# Patient Record
Sex: FEMALE | Race: WHITE | NOT HISPANIC OR LATINO | Employment: STUDENT | URBAN - METROPOLITAN AREA
[De-identification: names, ages, dates, MRNs, and addresses within clinical notes are randomized per-mention and may not be internally consistent; named-entity substitution may affect disease eponyms.]

---
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
## 2024-01-05 ENCOUNTER — OFFICE VISIT (OUTPATIENT)
Dept: PEDIATRICS | Facility: CLINIC | Age: 19
End: 2024-01-05
Payer: COMMERCIAL

## 2024-01-05 ENCOUNTER — LAB (OUTPATIENT)
Dept: LAB | Facility: LAB | Age: 19
End: 2024-01-05
Payer: COMMERCIAL

## 2024-01-05 VITALS — TEMPERATURE: 98.2 F | BODY MASS INDEX: 21.4 KG/M2 | WEIGHT: 124.7 LBS

## 2024-01-05 DIAGNOSIS — J45.21 MILD INTERMITTENT ASTHMA WITH ACUTE EXACERBATION (HHS-HCC): ICD-10-CM

## 2024-01-05 DIAGNOSIS — R89.9 ABNORMAL LABORATORY TEST RESULT: ICD-10-CM

## 2024-01-05 DIAGNOSIS — R89.9 ABNORMAL LABORATORY TEST RESULT: Primary | ICD-10-CM

## 2024-01-05 PROCEDURE — 1036F TOBACCO NON-USER: CPT | Performed by: PEDIATRICS

## 2024-01-05 PROCEDURE — 99213 OFFICE O/P EST LOW 20 MIN: CPT | Performed by: PEDIATRICS

## 2024-01-05 PROCEDURE — 80053 COMPREHEN METABOLIC PANEL: CPT

## 2024-01-05 PROCEDURE — 36415 COLL VENOUS BLD VENIPUNCTURE: CPT

## 2024-01-05 NOTE — PROGRESS NOTES
Subjective   Patient ID: Carito Rodriguez is a 18 y.o. female, otherwise healthy, who presents for Follow-up (Follow up from ER visit on 12/3/23 for an allergic reaction. Labs were abnormal, potassium was 2.9.).  She is alone today.    HPI  Carito Rodriguez is a 18 y.o. female presenting for a sick visit, unaccompanied.  She was at a party and was given an Amaretto drink to have.  She did not believe it had Amaretto in it.  Afterwards she started to feel like she was having an allergic reaction.  She self administered her EpiPen and went to the [facility] in [facility].  Her lab work at that time showed a low potassium.    Review of Systems  The following history was obtained from the patient.   Constitutional: Otherwise denies fever, chills, or changes in behavior. No difficulties with sleeping, eating, drinking, urine output, or bowel movements.    Eyes, ENT: Denies eye complaints, ear complaints, nasal congestion, runny nose, or sore throat.   Cardio/Resp: Denies chest pain, palpitations, shortness of breath, wheezing, stridor at rest, cough, working hard to breathe, or breathing fast.   GI/Renal: Denies nausea, vomiting, stomachache, diarrhea, or constipation. Denies dysuria or abnormal urine color or smell.   Musculoskeletal/Skin: Denies muscle or joint complaints. Denies skin rash.   Neuro/Psych: Denies headache, dizziness, confusion, irritability, or fussiness.   Endo/heme/lymph: Denies excessive thirst, excessive sweating, bruising, bleeding, or swollen glands.     Objective   Temp 36.8 °C (98.2 °F) (Temporal)   Wt 56.6 kg (124 lb 11.2 oz)   BMI 21.40 kg/m²   BSA: 1.6 meters squared  Growth percentiles: No height on file for this encounter. 45 %ile (Z= -0.12) based on CDC (Girls, 2-20 Years) weight-for-age data using vitals from 1/5/2024.     Physical Exam  Constitutional: Well developed, well nourished, well hydrated and no acute distress.  Head and Face: Normocephalic,  atraumatic.  Inspection and palpation of the face: Normal.  Eyes: Conjunctiva and lids normal.  Ears, Nose, Mouth, and Throat: No nasal discharge. External ears and nose without deformities. TM's normal color, normal landmarks, no fluid, non-retracted. External auditory canals without swelling, redness or tenderness. Pharyngeal mucosa with 2+ injected tonsils and injected tonsillar pillars and uvula.  No exudate, or lesions. Mucous membranes moist. Internal nose without abnormalities.  Neck: Positive bilateral anterior cervical lymph nodes are 3 x 0.5 cm and are nontender and mobile.  Thyroid not enlarged.  Pulmonary: No grunting, flaring or retractions. Clear to auscultation.  Cardiovascular: Regular rate and rhythm. No significant murmur.  Chest: Normal without deformity.  Abdomen: Soft, non-tender, no masses. No hepatomegaly or splenomegaly.   Skin: No significant rash or lesions.    Problem List Items Addressed This Visit             ICD-10-CM    Asthma J45.909    Abnormal laboratory test result - Primary R89.9    Relevant Orders    Comprehensive metabolic panel       Assessment/Plan Thank you for involving me in your care today. Carito is here for follow-up of a low potassium for her emergency room visit she had.  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  We will repeat a comprehensive metabolic panel today and fill out her withdrawal from school insurance paperwork.

## 2024-01-05 NOTE — PATIENT INSTRUCTIONS
Thank you for involving me in your care today. Carito is here for follow-up of a low potassium for her emergency room visit she had.  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  We will repeat a comprehensive metabolic panel today and fill out her withdrawal from school insurance paperwork.

## 2024-01-06 LAB
ALBUMIN SERPL BCP-MCNC: 4.7 G/DL (ref 3.4–5)
ALP SERPL-CCNC: 90 U/L (ref 33–110)
ALT SERPL W P-5'-P-CCNC: 12 U/L (ref 7–45)
ANION GAP SERPL CALC-SCNC: 13 MMOL/L (ref 10–20)
AST SERPL W P-5'-P-CCNC: 16 U/L (ref 9–39)
BILIRUB SERPL-MCNC: 1.2 MG/DL (ref 0–1.2)
BUN SERPL-MCNC: 13 MG/DL (ref 6–23)
CALCIUM SERPL-MCNC: 10.2 MG/DL (ref 8.6–10.6)
CHLORIDE SERPL-SCNC: 104 MMOL/L (ref 98–107)
CO2 SERPL-SCNC: 27 MMOL/L (ref 21–32)
CREAT SERPL-MCNC: 0.73 MG/DL (ref 0.5–1.05)
GFR SERPL CREATININE-BSD FRML MDRD: >90 ML/MIN/1.73M*2
GLUCOSE SERPL-MCNC: 75 MG/DL (ref 74–99)
POTASSIUM SERPL-SCNC: 4.3 MMOL/L (ref 3.5–5.3)
PROT SERPL-MCNC: 7.7 G/DL (ref 6.4–8.2)
SODIUM SERPL-SCNC: 140 MMOL/L (ref 136–145)

## 2024-10-02 ENCOUNTER — OFFICE VISIT (OUTPATIENT)
Dept: PEDIATRICS | Facility: CLINIC | Age: 19
End: 2024-10-02
Payer: COMMERCIAL

## 2024-10-02 VITALS — TEMPERATURE: 98.1 F | WEIGHT: 118 LBS | BODY MASS INDEX: 20.25 KG/M2

## 2024-10-02 DIAGNOSIS — J01.40 ACUTE NON-RECURRENT PANSINUSITIS: Primary | ICD-10-CM

## 2024-10-02 PROCEDURE — 1036F TOBACCO NON-USER: CPT | Performed by: PEDIATRICS

## 2024-10-02 PROCEDURE — 99213 OFFICE O/P EST LOW 20 MIN: CPT | Performed by: PEDIATRICS

## 2024-10-02 RX ORDER — AMOXICILLIN AND CLAVULANATE POTASSIUM 875; 125 MG/1; MG/1
875 TABLET, FILM COATED ORAL 2 TIMES DAILY
Qty: 20 TABLET | Refills: 0 | Status: SHIPPED | OUTPATIENT
Start: 2024-10-02 | End: 2024-10-12

## 2024-10-02 NOTE — PATIENT INSTRUCTIONS
Carito Rodriguez is a 19 y.o. female presenting for a sick visit. Medical, medication and allergy histories were reviewed. The patient has been sick for 3 weeks. She started with runny nose, nasal congestion, cough and fever up to 100 F (for one week). She then had a lingering runny nose and cough last week. Two days ago, she became sleepy, and has had a fever for the last 2 days. She has a mid-face headache.    Your child has a sinus infection, and I have prescribed Augmentin 875 mg, and your child's dose is 1 tablet(s) twice a day for 10 days.      If your child starts to have diarrhea, I would recommend strongly giving your child acidophilus. You can give this to him/her as Culturelle, Florastor, Align, or other types. I would give your child a one-unit dose 2 hours after giving the antibiotic. If you give it at the same time as the antibiotic, there will be decreased effectiveness of the antibiotic. Ask the pharmacist what the one-unit dose for your child would be. Probiotics are not controlled by the FDA, so there is no unified dosing. Call if your child gets worse.      Colds can last up to 2 weeks and do not need antibiotics, as they are caused by a virus. There are things you can do to help a cold get better faster.      #1 Hydrating your child will help a lot. For example, for an older child, 4-6 of the 16-ounce water bottles per day will help flush the virus from the system. Make sure your child is urinating once every 8 hours if they are older than one year old, and once every 6 hours if they are under the age of 1.      #2 Nasal saline washes will also clear the sinuses and not allow the mucous to get infected.      #3 Cool mist humidifier in the bedroom at night will help thin out the mucus as well. Just make sure it is cleaned regularly or you may be putting yeast spores into the environment. Near the humidifier equipment in all drugstores, you can find small balls that you put into the water of a cool  mist humidifier, and it prevents growth of anything or the sliminess for up to a month. One brand is Protect.      #4 Vitamin and zinc supplements may also help to some degree. Please give zinc on a full stomach, as sometimes it can make children nauseous. Children from 1-6 years old may have 25 mg of zinc per day. Older than that may have 50 mg per day.

## 2024-10-02 NOTE — PROGRESS NOTES
Subjective   Patient ID: Carito Rodriguez is a 19 y.o. female, otherwise healthy, who presents for URI (Sinus pressure ).    HPI  Carito Rodriguez is a 19 y.o. female presenting for a sick visit. Medical, medication and allergy histories were reviewed. The patient has been sick for 3 weeks. She started with runny nose, nasal congestion, cough and fever up to 100 F (for one week). She then had a lingering runny nose and cough last week. Two days ago, she became sleepy, and has had a fever for the last 2 days. She has a mid-face headache.    Review of Systems  The following history was obtained from patient.   Constitutional: Positive fever, sleepy. Otherwise denies chills, or changes in behavior. No difficulties with eating, drinking, urine output, or bowel movements.    Eyes, ENT: Positive runny nose, nasal congestion. Denies eye complaints, ear complaints, or sore throat.   Cardio/Resp: Positive cough. Denies chest pain, palpitations, shortness of breath, wheezing, stridor at rest, working hard to breathe, or breathing fast.   GI/Renal: Denies nausea, vomiting, stomachache, diarrhea, or constipation. Denies dysuria or abnormal urine color or smell.   Musculoskeletal/Skin: Denies muscle or joint complaints. Denies skin rash.   Neuro/Psych: Positive mid-face headache. Denies dizziness, confusion, irritability, or fussiness.   Endo/heme/lymph: Denies excessive thirst, excessive sweating, bruising, bleeding, or swollen glands.     Current Outpatient Medications   Medication Sig Dispense Refill    albuterol 90 mcg/actuation inhaler Inhale 2 puffs every 4 hours if needed for wheezing. 18 g 2    amoxicillin-pot clavulanate (Augmentin) 875-125 mg tablet Take 1 tablet (875 mg) by mouth 2 times a day for 10 days. 20 tablet 0    Auvi-Q 0.3 mg/0.3 mL injection syringe Inject 0.3 mL (0.3 mg) as directed if needed for anaphylaxis.      cholecalciferol, vitamin D3, (VITAMIN D3 ORAL) Take by mouth.      inhalational spacing device (POCKET  CHAMBER) inhaler Use as instructed 1 each 0    medroxyPROGESTERone 150 mg/mL injection INJECT 1 (ONE) mL INTRAMUSCULARLY once       No current facility-administered medications for this visit.        Allergies   Allergen Reactions    Peanut Anaphylaxis and Hives    Tree Nut Anaphylaxis    Banana Itching    Banana Extract Itching    Egg Hives        No family history on file.     Objective   Temp 36.7 °C (98.1 °F)   Wt 53.5 kg (118 lb)   BMI 20.25 kg/m²   BSA: 1.55 meters squared  Growth percentiles: Facility age limit for growth %regi is 20 years. Facility age limit for growth %regi is 20 years.     Physical Exam  Constitutional: Well developed, well nourished, well hydrated and no acute distress.  HENT:      Head: Both frontal and maxillary sinuses are painful to palpation.     Ears: External ears normal and without deformities. Normal TMs.       Nose: Nose normal, patent nares and without deformities.      Mouth/Throat: Very injected tonsillar pillars. Injected tonsils. Injected uvula.  Eyes: Conjunctiva and lids normal.  Neck: Bilateral anterior cervical lymph nodes are  3 cm by 0.5 cm , mildly tender but mobile.    Pulmonary: No grunting, flaring or retractions. Clear to auscultation.  Cardiovascular: Regular rate and rhythm. No significant murmur.  Chest: Normal without deformity.  Abdomen: Soft, non-tender, no masses. No hepatomegaly or splenomegaly.   Skin: No significant rash or lesions.    Problem List Items Addressed This Visit             ICD-10-CM    Acute non-recurrent pansinusitis - Primary J01.40    Relevant Medications    amoxicillin-pot clavulanate (Augmentin) 875-125 mg tablet     Time in: 11:30 am  Time done: 11:41 am    Assessment/Plan    Carito Rodriguez is a 19 y.o. female presenting for a sick visit. Medical, medication and allergy histories were reviewed. The patient has been sick for 3 weeks. She started with runny nose, nasal congestion, cough and fever up to 100 F (for one week). She then  had a lingering runny nose and cough last week. Two days ago, she became sleepy, and has had a fever for the last 2 days. She has a mid-face headache.    Your child has a sinus infection, and I have prescribed Augmentin 875 mg, and your child's dose is 1 tablet(s) twice a day for 10 days.      If your child starts to have diarrhea, I would recommend strongly giving your child acidophilus. You can give this to him/her as Culturelle, Florastor, Align, or other types. I would give your child a one-unit dose 2 hours after giving the antibiotic. If you give it at the same time as the antibiotic, there will be decreased effectiveness of the antibiotic. Ask the pharmacist what the one-unit dose for your child would be. Probiotics are not controlled by the FDA, so there is no unified dosing. Call if your child gets worse.      Colds can last up to 2 weeks and do not need antibiotics, as they are caused by a virus. There are things you can do to help a cold get better faster.      #1 Hydrating your child will help a lot. For example, for an older child, 4-6 of the 16-ounce water bottles per day will help flush the virus from the system. Make sure your child is urinating once every 8 hours if they are older than one year old, and once every 6 hours if they are under the age of 1.      #2 Nasal saline washes will also clear the sinuses and not allow the mucous to get infected.      #3 Cool mist humidifier in the bedroom at night will help thin out the mucus as well. Just make sure it is cleaned regularly or you may be putting yeast spores into the environment. Near the humidifier equipment in all drugstores, you can find small balls that you put into the water of a cool mist humidifier, and it prevents growth of anything or the sliminess for up to a month. One brand is Protect.      #4 Vitamin and zinc supplements may also help to some degree. Please give zinc on a full stomach, as sometimes it can make children nauseous.  Children from 1-6 years old may have 25 mg of zinc per day. Older than that may have 50 mg per day.     Scribe Attestation  By signing my name below, I, Beatriz Garcia, attest that this documentation has been prepared under the direction and in the presence of Dr. Mihaela Hankins.    Provider Attestation - Scribe documentation  All medical record entries made by the Scribe were at my direction and personally dictated by me. I have reviewed the chart and agree that the record accurately reflects my personal performance of the history, physical exam, discussion and plan.

## 2024-11-06 ENCOUNTER — OFFICE VISIT (OUTPATIENT)
Dept: PEDIATRICS | Facility: CLINIC | Age: 19
End: 2024-11-06
Payer: COMMERCIAL

## 2024-11-06 VITALS
BODY MASS INDEX: 19.56 KG/M2 | WEIGHT: 117.4 LBS | HEIGHT: 65 IN | DIASTOLIC BLOOD PRESSURE: 70 MMHG | SYSTOLIC BLOOD PRESSURE: 110 MMHG

## 2024-11-06 DIAGNOSIS — F90.2 ATTENTION DEFICIT HYPERACTIVITY DISORDER (ADHD), COMBINED TYPE: Primary | ICD-10-CM

## 2024-11-06 PROCEDURE — 1036F TOBACCO NON-USER: CPT | Performed by: PEDIATRICS

## 2024-11-06 PROCEDURE — 3008F BODY MASS INDEX DOCD: CPT | Performed by: PEDIATRICS

## 2024-11-06 PROCEDURE — 99214 OFFICE O/P EST MOD 30 MIN: CPT | Performed by: PEDIATRICS

## 2024-11-06 RX ORDER — DEXTROAMPHETAMINE SACCHARATE, AMPHETAMINE ASPARTATE, DEXTROAMPHETAMINE SULFATE AND AMPHETAMINE SULFATE 5; 5; 5; 5 MG/1; MG/1; MG/1; MG/1
20 TABLET ORAL 2 TIMES DAILY
Qty: 60 TABLET | Refills: 0 | Status: SHIPPED | OUTPATIENT
Start: 2024-11-06 | End: 2024-12-06

## 2024-11-06 NOTE — PATIENT INSTRUCTIONS
Carito presents for a ADHD follow-up.    I prescribed short acting Adderall 20 mg, twice per day.    PLEASE FOLLOW-UP WITH ME IN 1 MONTH.

## 2024-11-06 NOTE — PROGRESS NOTES
Subjective   Patient ID: Carito Rodriguez is a 19 y.o. female, otherwise healthy, who presents for a follow-up for ADHD.    HPI  Carito Rodriguez is a 19 y.o. female presenting for a follow-up on ADHD. She was last on Adderall IR 20 mg, 1-2 times per day. Adderall XR did not help her. She started going to [employer] in 2022 and left in January 2024. She would like to go back on ADHD medication.  She has tried numerous stimulants and they have not worked for her.  She would like to go back on something that she knows works for her until [employer] school is over.  She finishes next summer.    Review of Systems  The following history was obtained from patient.   Constitutional: Otherwise denies fever, chills, or changes in behavior. No difficulties with sleeping, eating, drinking, urine output, or bowel movements.    Eyes, ENT: Denies eye complaints, ear complaints, nasal congestion, runny nose, or sore throat.   Cardio/Resp: Denies chest pain, palpitations, shortness of breath, wheezing, stridor at rest, cough, working hard to breathe, or breathing fast.   GI/Renal: Denies nausea, vomiting, stomachache, diarrhea, or constipation. Denies dysuria or abnormal urine color or smell.   Musculoskeletal/Skin: Denies muscle or joint complaints. Denies skin rash.   Neuro/Psych: Positive ADHD. Denies headache, dizziness, confusion, irritability, or fussiness.   Endo/heme/lymph: Denies excessive thirst, excessive sweating, bruising, bleeding, or swollen glands.     Current Outpatient Medications   Medication Sig Dispense Refill    albuterol 90 mcg/actuation inhaler Inhale 2 puffs every 4 hours if needed for wheezing. 18 g 2    amphetamine-dextroamphetamine (Adderall) 20 mg tablet Take 1 tablet (20 mg) by mouth 2 times a day. 60 tablet 0    Auvi-Q 0.3 mg/0.3 mL injection syringe Inject 0.3 mL (0.3 mg) as directed if needed for anaphylaxis.      cholecalciferol, vitamin D3, (VITAMIN D3 ORAL) Take by mouth.      inhalational spacing  "device (POCKET CHAMBER) inhaler Use as instructed 1 each 0    medroxyPROGESTERone 150 mg/mL injection INJECT 1 (ONE) mL INTRAMUSCULARLY once       No current facility-administered medications for this visit.        Allergies   Allergen Reactions    Peanut Anaphylaxis and Hives    Tree Nut Anaphylaxis    Banana Itching    Banana Extract Itching    Egg Hives        No family history on file.     Objective   /70   Ht 1.657 m (5' 5.25\")   Wt 53.3 kg (117 lb 6.4 oz)   BMI 19.39 kg/m²   BSA: 1.57 meters squared  Growth percentiles: Facility age limit for growth %regi is 20 years. Facility age limit for growth %regi is 20 years.     Physical Exam  Constitutional: Well developed, well nourished, well hydrated and no acute distress.  HENT:      Head: Normocephalic, atraumatic, normal palpation and inspection of face.      Ears: External ears normal and without deformities. Normal TMs.       Nose: Nose normal, patent nares and without deformities.      Mouth/Throat: Mucous membranes are moist. Normal palate. Oropharynx is clear.  Eyes: Conjunctiva and lids normal.  Neck: No significant cervical adenopathy. Thyroid not enlarged.  Pulmonary: No grunting, flaring or retractions. Clear to auscultation.  Cardiovascular: Regular rate and rhythm. No significant murmur.  Chest: Normal without deformity.  Abdomen: Soft, non-tender, no masses. No hepatomegaly or splenomegaly.   Skin: No significant rash or lesions.    Problem List Items Addressed This Visit             ICD-10-CM       Mental Health    ADHD (attention deficit hyperactivity disorder) - Primary F90.9    Relevant Medications    amphetamine-dextroamphetamine (Adderall) 20 mg tablet     Time in: 10:58 am  Time done: 11:18 am    Assessment/Plan    Carito presents for a ADHD follow-up.    I prescribed short acting Adderall 20 mg, twice per day.    PLEASE FOLLOW-UP WITH ME IN 1 MONTH.     Scribe Attestation  By signing my name below, I, Yomi Harris, Beatriz, attest " that this documentation has been prepared under the direction and in the presence of Dr. Mihaela Hankins.    Provider Attestation - Scribe documentation  All medical record entries made by the Scribe were at my direction and personally dictated by me. I have reviewed the chart and agree that the record accurately reflects my personal performance of the history, physical exam, discussion and plan.

## 2024-11-18 ENCOUNTER — OFFICE VISIT (OUTPATIENT)
Dept: PEDIATRICS | Facility: CLINIC | Age: 19
End: 2024-11-18
Payer: COMMERCIAL

## 2024-11-18 VITALS — WEIGHT: 119.4 LBS | TEMPERATURE: 98.1 F | BODY MASS INDEX: 19.72 KG/M2

## 2024-11-18 DIAGNOSIS — N61.0 NIPPLE INFECTION IN FEMALE: Primary | ICD-10-CM

## 2024-11-18 DIAGNOSIS — B37.31 VAGINAL YEAST INFECTION: ICD-10-CM

## 2024-11-18 PROCEDURE — 99213 OFFICE O/P EST LOW 20 MIN: CPT | Performed by: PEDIATRICS

## 2024-11-18 PROCEDURE — 1036F TOBACCO NON-USER: CPT | Performed by: PEDIATRICS

## 2024-11-18 RX ORDER — FLUCONAZOLE 150 MG/1
150 TABLET ORAL ONCE
Qty: 1 TABLET | Refills: 1 | Status: SHIPPED | OUTPATIENT
Start: 2024-11-18 | End: 2024-11-18

## 2024-11-18 RX ORDER — MUPIROCIN 20 MG/G
OINTMENT TOPICAL 3 TIMES DAILY
Qty: 22 G | Refills: 0 | Status: SHIPPED | OUTPATIENT
Start: 2024-11-18 | End: 2024-11-28

## 2024-11-18 RX ORDER — CEPHALEXIN 500 MG/1
500 CAPSULE ORAL 3 TIMES DAILY
Qty: 30 CAPSULE | Refills: 0 | Status: SHIPPED | OUTPATIENT
Start: 2024-11-18 | End: 2024-11-28

## 2024-11-18 NOTE — PROGRESS NOTES
Subjective   Patient ID: Carito Rodriguez is a 19 y.o. female  who presents for infected nipple piercing.        HPI:  Carito has had her nipples pierced for about 3 years.  She wears the same jewelry, but recently snagged it on a towel.  For the past 3 days, both nipples have been looking infected.  No fever or chills.    She also gets vaginal yeast infections when she is on an antibiotic, so she would like Diflucan if one is prescribed.                  Objective   Temp 36.7 °C (98.1 °F)   Wt 54.2 kg (119 lb 6.4 oz)   BMI 19.72 kg/m²   BSA: 1.58 meters squared  Growth percentiles: Facility age limit for growth %regi is 20 years. Facility age limit for growth %regi is 20 years.     Physical Exam  Constitutional:       Appearance: Normal appearance.   Skin:     Comments: Metal stud through both nipples, with some crusted drainage at the piercing sites bilaterally.  No purulence or visible swelling.  No significant erythema.    Neurological:      Mental Status: She is alert.         Assessment/Plan   Diagnoses and all orders for this visit:  Nipple infection in female  -     mupirocin (Bactroban) 2 % ointment; Apply topically 3 times a day for 10 days.  -     cephalexin (Keflex) 500 mg capsule; Take 1 capsule (500 mg) by mouth 3 times a day for 10 days.  Vaginal yeast infection  -     fluconazole (Diflucan) 150 mg tablet; Take 1 tablet (150 mg) by mouth 1 time for 1 dose.  Discussed removing nipple jewelry if symptoms are not improving over the next several days.

## 2024-12-06 ENCOUNTER — APPOINTMENT (OUTPATIENT)
Dept: PEDIATRICS | Facility: CLINIC | Age: 19
End: 2024-12-06
Payer: COMMERCIAL

## 2024-12-19 ENCOUNTER — APPOINTMENT (OUTPATIENT)
Dept: PEDIATRICS | Facility: CLINIC | Age: 19
End: 2024-12-19
Payer: COMMERCIAL

## 2024-12-19 VITALS
BODY MASS INDEX: 19.99 KG/M2 | SYSTOLIC BLOOD PRESSURE: 108 MMHG | DIASTOLIC BLOOD PRESSURE: 70 MMHG | HEIGHT: 65 IN | WEIGHT: 120 LBS

## 2024-12-19 DIAGNOSIS — Z00.00 WELL ADULT EXAM: Primary | ICD-10-CM

## 2024-12-19 DIAGNOSIS — F90.2 ATTENTION DEFICIT HYPERACTIVITY DISORDER (ADHD), COMBINED TYPE: ICD-10-CM

## 2024-12-19 PROCEDURE — 99213 OFFICE O/P EST LOW 20 MIN: CPT | Performed by: PEDIATRICS

## 2024-12-19 PROCEDURE — 3008F BODY MASS INDEX DOCD: CPT | Performed by: PEDIATRICS

## 2024-12-19 PROCEDURE — 1036F TOBACCO NON-USER: CPT | Performed by: PEDIATRICS

## 2024-12-19 PROCEDURE — 99395 PREV VISIT EST AGE 18-39: CPT | Performed by: PEDIATRICS

## 2024-12-19 PROCEDURE — 96127 BRIEF EMOTIONAL/BEHAV ASSMT: CPT | Performed by: PEDIATRICS

## 2024-12-19 RX ORDER — DEXTROAMPHETAMINE SACCHARATE, AMPHETAMINE ASPARTATE, DEXTROAMPHETAMINE SULFATE AND AMPHETAMINE SULFATE 5; 5; 5; 5 MG/1; MG/1; MG/1; MG/1
20 TABLET ORAL DAILY
Qty: 30 TABLET | Refills: 0 | Status: SHIPPED | OUTPATIENT
Start: 2024-12-19 | End: 2025-01-18

## 2024-12-19 RX ORDER — DEXTROAMPHETAMINE SACCHARATE, AMPHETAMINE ASPARTATE, DEXTROAMPHETAMINE SULFATE AND AMPHETAMINE SULFATE 5; 5; 5; 5 MG/1; MG/1; MG/1; MG/1
20 TABLET ORAL DAILY
Qty: 30 TABLET | Refills: 0 | Status: SHIPPED | OUTPATIENT
Start: 2025-01-18 | End: 2025-02-17

## 2024-12-19 RX ORDER — DEXTROAMPHETAMINE SACCHARATE, AMPHETAMINE ASPARTATE, DEXTROAMPHETAMINE SULFATE AND AMPHETAMINE SULFATE 5; 5; 5; 5 MG/1; MG/1; MG/1; MG/1
20 TABLET ORAL DAILY
Qty: 30 TABLET | Refills: 0 | Status: SHIPPED | OUTPATIENT
Start: 2025-02-17 | End: 2025-03-19

## 2024-12-19 ASSESSMENT — PATIENT HEALTH QUESTIONNAIRE - GENERAL
DIZZINESS: NOT BOTHERED
CONSTIPATION, LOOSE BOWELS, OR DIARRHEA: NOT BOTHERED
DO YOU OFTEN EAT, WITHIN ANY 2-HOUR PERIOD, WHAT MOST PEOPLE WOULD REGARD AS AN UNUSUALLY LARGE AMOUNT OF FOOD: NO
MENSTRUAL CRAMPS OR OTHER PROBLEMS WITH YOUR PERIODS: NOT BOTHERED
YOU HAD A PROBLEM GETTING ALONG WITH OTHER PEOPLE WHILE YOU WERE DRINKING: NO
YOU MISSED OR WERE LATE FOR WORK, SCHOOL, OR OTHER ACTIVITIES BECAUSE YOU WERE DRINKING OR HUNG OVER: NO
DIZZINESS: NOT BOTHERED
YOU DRANK ALCOHOL EVEN THOUGH A DOCTOR SUGGESTED THAT YOU STOP DRINKING BECAUSE OF A PROBLEM WITH YOUR HEALTH: NO
MENSTRUAL CRAMPS OR OTHER PROBLEMS WITH YOUR PERIODS: NOT BOTHERED
YOU DRANK ALCOHOL, WERE HIGH FROM ALCOHOL, OR HUNG OVER WHILE YOU WERE WORKING, GOING TO SCHOOL, OR TAKING CARE OF CHILDREN OR OTHER RESPONSIBILITIES: NO
DO YOU OFTEN FEEL THAT YOU CANNOT CONTROL WHAT OR HOW MUCH YOU EAT: NO
CONSTIPATION, LOOSE BOWELS, OR DIARRHEA: NOT BOTHERED
FEELING NERVOUS, ANXIOUS, ON EDGE, OR WORRYING A LOT ABOUT DIFFERENT THINGS: NOT AT ALL
NAUSEA GAS OR INDIGESTION: NOT BOTHERED
YOU MISSED OR WERE LATE FOR WORK, SCHOOL, OR OTHER ACTIVITIES BECAUSE YOU WERE DRINKING OR HUNG OVER: NO
FEELING YOUR HEART POUND OR RACE: NOT BOTHERED
HEADACHES: BOTHERED A LITTLE
IN THE LAST 4 WEEKS, HAVE YOU HAD AN ANXIETY ATTACK - SUDDENLY FEELING FEAR OR PANIC: NO
SHORTNESS OF BREATH: NOT BOTHERED
DO YOU EVER DRINK ALCOHOL (INCLUDING BEER OR WINE): YES
FEELING YOUR HEART POUND OR RACE: NOT BOTHERED
YOU HAD A PROBLEM GETTING ALONG WITH OTHER PEOPLE WHILE YOU WERE DRINKING: NO
FEELING NERVOUS, ANXIOUS, ON EDGE, OR WORRYING A LOT ABOUT DIFFERENT THINGS: NOT AT ALL
PAIN OR PROBLEMS DURING SEXUAL INTERCOURSE: NOT BOTHERED
PAIN IN YOUR ARMS, LEGS, OR JOINTS (KNEES, HIPS, ETC.): NOT BOTHERED
HEADACHES: BOTHERED A LITTLE
DO YOU EVER DRINK ALCOHOL: YES
PAIN IN YOUR ARMS, LEGS, OR JOINTS (KNEES, HIPS, ETC.): NOT BOTHERED
YOU DRANK ALCOHOL EVEN THOUGH A DOCTOR SUGGESTED THAT YOU STOP DRINKING BECAUSE OF A PROBLEM WITH YOUR HEALTH: NO
IN THE LAST 4 WEEKS, HAVE YOU HAD AN ANXIETY ATTACK - SUDDENLY FEELING FEAR OR PANIC: NO
FAINTING SPELLS: NOT BOTHERED
STOMACH PAIN: BOTHERED A LITTLE
CHEST PAIN: NOT BOTHERED
CHEST PAIN: NOT BOTHERED
SHORTNESS OF BREATH: NOT BOTHERED
DO YOU OFTEN FEEL THAT YOU CANNOT CONTROL WHAT OR HOW MUCH YOU EAT: NO
YOU DRANK ALCOHOL, WERE HIGH FROM ALCOHOL, OR HUNG OVER WHILE YOU WERE WORKING, GOING TO SCHOOL, OR TAKING CARE OF CHILDREN OR OTHER RESPONSIBILITIES: NO
BACK PAIN: NOT BOTHERED
YOU DROVE A CAR AFTER HAVING SEVERAL DRINKS OR AFTER DRINKING TOO MUCH: NO
STOMACH PAIN: BOTHERED A LITTLE
FAINTING SPELLS: NOT BOTHERED
NAUSEA GAS OR INDEGESTION: NOT BOTHERED
DO YOU OFTEN EAT, WITHIN ANY 2-HOUR PERIOD, WHAT MOST PEOPLE WOULD REGARD AS AN UNUSUALLY LARGE AMOUNT OF FOOD: NO
BACK PAIN: NOT BOTHERED
YOU DROVE A CAR AFTER HAVING SEVERAL DRINKS OR AFTER DRINKING TOO MUCH: NO
PAIN OR PROBLEMS DURING SEXUAL INTERCOURSE: NOT BOTHERED

## 2024-12-19 ASSESSMENT — PATIENT HEALTH QUESTIONNAIRE - PHQ9
7. TROUBLE CONCENTRATING ON THINGS, SUCH AS READING THE NEWSPAPER OR WATCHING TELEVISION: SEVERAL DAYS
1. LITTLE INTEREST OR PLEASURE IN DOING THINGS: NOT AT ALL
SUM OF ALL RESPONSES TO PHQ QUESTIONS 1-9: 3
2. FEELING DOWN, DEPRESSED OR HOPELESS: NOT AT ALL
6. FEELING BAD ABOUT YOURSELF - OR THAT YOU ARE A FAILURE OR HAVE LET YOURSELF OR YOUR FAMILY DOWN: NOT AT ALL
1. LITTLE INTEREST OR PLEASURE IN DOING THINGS: NOT AT ALL
2. FEELING DOWN, DEPRESSED OR HOPELESS: NOT AT ALL
6. FEELING BAD ABOUT YOURSELF - OR THAT YOU ARE A FAILURE OR HAVE LET YOURSELF OR YOUR FAMILY DOWN: NOT AT ALL
9. THOUGHTS THAT YOU WOULD BE BETTER OFF DEAD, OR OF HURTING YOURSELF: NOT AT ALL
4. FEELING TIRED OR HAVING LITTLE ENERGY: SEVERAL DAYS
3. TROUBLE FALLING OR STAYING ASLEEP OR SLEEPING TOO MUCH: SEVERAL DAYS
8. MOVING OR SPEAKING SO SLOWLY THAT OTHER PEOPLE COULD HAVE NOTICED. OR THE OPPOSITE, BEING SO FIGETY OR RESTLESS THAT YOU HAVE BEEN MOVING AROUND A LOT MORE THAN USUAL: NOT AT ALL
5. POOR APPETITE OR OVEREATING: NOT AT ALL
SUM OF ALL RESPONSES TO PHQ9 QUESTIONS 1 & 2: 0
8. MOVING OR SPEAKING SO SLOWLY THAT OTHER PEOPLE COULD HAVE NOTICED. OR THE OPPOSITE, BEING SO FIGETY OR RESTLESS THAT YOU HAVE BEEN MOVING AROUND A LOT MORE THAN USUAL: NOT AT ALL
10. IF YOU CHECKED OFF ANY PROBLEMS, HOW DIFFICULT HAVE THESE PROBLEMS MADE IT FOR YOU TO DO YOUR WORK, TAKE CARE OF THINGS AT HOME, OR GET ALONG WITH OTHER PEOPLE: NOT DIFFICULT AT ALL
4. FEELING TIRED OR HAVING LITTLE ENERGY: SEVERAL DAYS
10. IF YOU CHECKED OFF ANY PROBLEMS, HOW DIFFICULT HAVE THESE PROBLEMS MADE IT FOR YOU TO DO YOUR WORK, TAKE CARE OF THINGS AT HOME, OR GET ALONG WITH OTHER PEOPLE: NOT DIFFICULT AT ALL
3. TROUBLE FALLING OR STAYING ASLEEP OR SLEEPING TOO MUCH: SEVERAL DAYS
7. TROUBLE CONCENTRATING ON THINGS, SUCH AS READING THE NEWSPAPER OR WATCHING TELEVISION: SEVERAL DAYS
5. POOR APPETITE OR OVEREATING: NOT AT ALL
9. THOUGHTS THAT YOU WOULD BE BETTER OFF DEAD, OR OF HURTING YOURSELF: NOT AT ALL

## 2024-12-19 ASSESSMENT — ANXIETY QUESTIONNAIRES
4. TROUBLE RELAXING: NOT AT ALL
3. WORRYING TOO MUCH ABOUT DIFFERENT THINGS: NOT AT ALL
GAD7 TOTAL SCORE: 2
1. FEELING NERVOUS, ANXIOUS, OR ON EDGE: NOT AT ALL
IF YOU CHECKED OFF ANY PROBLEMS ON THIS QUESTIONNAIRE, HOW DIFFICULT HAVE THESE PROBLEMS MADE IT FOR YOU TO DO YOUR WORK, TAKE CARE OF THINGS AT HOME, OR GET ALONG WITH OTHER PEOPLE: NOT DIFFICULT AT ALL
2. NOT BEING ABLE TO STOP OR CONTROL WORRYING: NOT AT ALL
5. BEING SO RESTLESS THAT IT IS HARD TO SIT STILL: SEVERAL DAYS
4. TROUBLE RELAXING: NOT AT ALL
5. BEING SO RESTLESS THAT IT IS HARD TO SIT STILL: SEVERAL DAYS
1. FEELING NERVOUS, ANXIOUS, OR ON EDGE: NOT AT ALL
7. FEELING AFRAID AS IF SOMETHING AWFUL MIGHT HAPPEN: NOT AT ALL
IF YOU CHECKED OFF ANY PROBLEMS ON THIS QUESTIONNAIRE, HOW DIFFICULT HAVE THESE PROBLEMS MADE IT FOR YOU TO DO YOUR WORK, TAKE CARE OF THINGS AT HOME, OR GET ALONG WITH OTHER PEOPLE: NOT DIFFICULT AT ALL
6. BECOMING EASILY ANNOYED OR IRRITABLE: SEVERAL DAYS
7. FEELING AFRAID AS IF SOMETHING AWFUL MIGHT HAPPEN: NOT AT ALL
6. BECOMING EASILY ANNOYED OR IRRITABLE: SEVERAL DAYS
3. WORRYING TOO MUCH ABOUT DIFFERENT THINGS: NOT AT ALL
2. NOT BEING ABLE TO STOP OR CONTROL WORRYING: NOT AT ALL

## 2024-12-19 NOTE — PATIENT INSTRUCTIONS
"Carito, thank you for involving me in your care today. 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    I ordered urine collection for a mandatory drug screen.    I refilled your short acting Adderall 20 mg. It is good for 6 months.    Please remember, you cannot call the pharmacy for a refill of the stimulant, as each prescription is separate onto itself. Please record when you should call for the next prescription to be filled. I have ordered one to be refilled today, the next month to be filled 25, and the third prescription can be filled 25. These days may change based on restrictions placed by your insurance company. Please call a week before you need the next one after that.     For safety, we talked about making a home fire safety plan and having a solid plan for where the family would congregate outside the house in the case of a fire inside the house.  Please also make sure that bedroom doors are closed at night as this will help save lives as well.  Also, please make sure you have a working fire extinguisher. The fire extinguisher in your kitchen should have a \"K\" on it. You should also have a fire blanket. It is important to note that smoke detectors and carbon monoxide detectors  after 10 years.     To prevent sunburn, try to stay in the shade and not have your child out in direct sun between the hours of 10 am and 2 pm. You should use a sunscreen with an SPF equal to or greater than 15 with UVA and UVB protection. Even if it claims to be waterproof, you will need to reapply often; as much as every hour while on a beach. If there is sunburn, Aloe Vera gel helps relieve the pain and heal the skin. Also, hats and sunglasses are helpful if the child will wear them. I recommend Aveeno Baby, Neutrogena Sensitive Skin, and Vanicream sunscreens. You may need to ask the pharmacist for the Vanicream, but it is not a prescription lotion.   "

## 2024-12-19 NOTE — PROGRESS NOTES
HPI:  Carito is a 19 y.o. female who presents today for her Health Maintenance and Supervision Exam. Medical, medication and allergy histories were reviewed. She is on Auvi-Q and gets a Depo shot. She has a history of ADHD. She is on short acting Adderall 20 mg. She does not take it every day or twice per day. She has been doing well.    She had her glasses checked last week.    Her OB/GYN is Dr. Tidwell.    ASQ was a No for questions 1 through 4 and a No for question 5.     The PHQ-9A is 3.  The severity measure for depression age 11-17, PHQ-9 modified for adolescence scoring results are as follows: 0-4 = none, 5-9 = mild, 10-14 = moderate, 15-19 = moderately severe, and 20-27 = severe.     The ANNABELLE-7 is 2.  The scoring scale is as follows: 0-5 is minimal anxiety, 6-10 is mild anxiety, 11-15 is moderate anxiety, and 16-21 is severe anxiety. A score greater than 7 is clinically significant.     I have personally reviewed the OARRS report for this patient on 11/6/24. I have considered the risks of abuse, dependence, addition, and diversion.      I have reviewed the controlled substance agreement with patient/caregiver on 11/6/24.     General Health:  Carito is overall in good health.  Concerns today: No    Social and Family History:  At home, there have been no interval changes.  Parental support, work/family balance? YES    Nutrition:  Current Diet: vegetables, fruits, meats, dairy    Food Security:  Within the past 12 months, have you worried that your food would run out before you got money to buy more?   NO  Within the past 12 months, the food you bought just did not last and you did not have money to get more?  NO    Dental Care:  Carito has a dental home? YES  Dental hygiene regularly performed? YES  Fluoridated water: YES    Current Outpatient Medications   Medication Sig Dispense Refill    albuterol 90 mcg/actuation inhaler Inhale 2 puffs every 4 hours if needed for wheezing. 18 g 2    inhalational spacing  device (POCKET CHAMBER) inhaler Use as instructed 1 each 0    amphetamine-dextroamphetamine (Adderall) 20 mg tablet Take 1 tablet (20 mg) by mouth 2 times a day. 60 tablet 0    Auvi-Q 0.3 mg/0.3 mL injection syringe Inject 0.3 mL (0.3 mg) as directed if needed for anaphylaxis.      cholecalciferol, vitamin D3, (VITAMIN D3 ORAL) Take by mouth. (Patient not taking: Reported on 2024)      medroxyPROGESTERone 150 mg/mL injection INJECT 1 (ONE) mL INTRAMUSCULARLY once       No current facility-administered medications for this visit.        Allergies   Allergen Reactions    Peanut Anaphylaxis and Hives    Tree Nut Anaphylaxis    Banana Itching    Banana Extract Itching    Egg Hives        No family history on file.     Elimination:  Elimination patterns appropriate:  YES    Sleep:  Sleep patterns appropriate? NO  Sleep problems: YES, due to her own doing (per patient)    Sex specific Data:  She does not get a period on Depo.    Behavior/Socialization:  Activities with peers? YES  Close friends or family? 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  Any educational accommodations? No  Academic Performance:  Doing well.  Acclimated to school? YES  She works two jobs.    Activities:  Physical Activity and sports: YES - snowboarding    Sports clearance questions:  Have you ever had a concussion?  One, cleared.  Have you ever fainted?  No  Have you ever had shortness of breath more than others?  No  Have you ever had rapid or skipped heartbeats?  No  Have you ever had chest pain?  No  Has anyone in your family had a heart attack before the age of 50?  No  Has anyone in your family  without a cause before the age of 50?  No  Has anyone in your family been diagnosed with Cb-Parkinson-White syndrome, long QT syndrome or Brugada syndrome?  No   Has anyone in your family been diagnosed with unexplained arrhythmias, or cardiomyopathy?  NO    RISK factors:  Dating? YES  Self designated:  heterosexual  Self identity: questioning? NO  Sexual activity? YES.  Number of partners: 7.  Form of birth control: Depo shot.  Alcohol?  3-4 beers per week.  Marijuana? NO  Drugs?  NO  Smoking? NO  Vaping? One hit per month    Review of Systems:  Constitutional: Otherwise denies fever, chills, or changes in behavior. No difficulties with sleeping, eating, drinking, urine output, or bowel movements.    Eyes, ENT: Denies eye complaints, ear complaints, nasal congestion, runny nose, or sore throat.   Cardio/Resp: Denies chest pain, palpitations, shortness of breath, wheezing, stridor at rest, cough, working hard to breathe, or breathing fast.   GI/Renal: Denies nausea, vomiting, stomachache, diarrhea, or constipation. Denies dysuria or abnormal urine color or smell.   Musculoskeletal/Skin: Denies muscle or joint complaints. Denies skin rash.   Neuro/Psych: Denies headache, dizziness, or confusion.  Endo/heme/lymph: Denies excessive thirst, excessive sweating, bruising, bleeding, or swollen glands.     Safety Assessment:  Safety topics were reviewed  Seat belt: YES        Driving without distractions and not under the influence:  YES  Refusing to be a passenger if the  is compromised: YES    Exposure to pets: NO    Fire Safety Plan: YES       Bedroom door closed when sleeping:  Sometimes  Smoke detectors: YES       Second hand smoke: No  Fire extinguisher: YES       Carbon monoxide detectors: YES  Sun safety/ Sunscreen: NO      Water Safety: NO   Heat safety: YES              Firearms in house: NO    Helmet and Mouthguard:  YES           Internet and texting safety: YES     Physical Exam  Vitals reviewed.   Constitutional:       General: female is active.      Appearance: Normal appearance. female is well-developed.   HENT:      Head: Normocephalic.      Right Ear: Fat pad deep in ear canal. External ear normal and without deformities. Normal TM.      Left Ear: External ear normal and without deformities. Normal  TM.      Nose: Nose normal, patent nares and without deformities.      Mouth/Throat: Normal palate     Mouth: Mucous membranes are moist.      Pharynx: Oropharynx is clear.   Neck:     General: Bilateral anterior cervical lymph nodes are  2 cm by 0.5 cm , mildly tender but mobile.       Eyes:      Extraocular Movements: Extraocular movements intact.      Conjunctiva/sclera: Conjunctivae normal.      Pupils: Pupils are equal, round, and reactive to light.   Cardiovascular:      Rate and Rhythm: Normal rate and regular rhythm.      Pulses: Normal pulses.      Heart sounds: Normal heart sounds.   Pulmonary:      Effort: Pulmonary effort is normal.      Breath sounds: Normal breath sounds.   Abdominal:      General: Abdomen is flat.      Palpations: Abdomen is soft.   Musculoskeletal:         General: Normal range of motion, strength and tone.     Cervical back: Normal range of motion and neck supple.   Skin:     General: Skin is warm and dry.      Capillary Refill: Capillary refill takes less than 2 seconds.      Turgor: Normal.   Neurological:      General: No focal deficit present.      Mental Status: female is alert.       Problem List Items Addressed This Visit          Health Encounters    Well adult exam - Primary       Mental Health    ADHD (attention deficit hyperactivity disorder)    Relevant Medications    amphetamine-dextroamphetamine (Adderall) 20 mg tablet    amphetamine-dextroamphetamine (Adderall) 20 mg tablet (Start on 1/18/2025)    amphetamine-dextroamphetamine (Adderall) 20 mg tablet (Start on 2/17/2025)    Other Relevant Orders    Drug Screen, Urine With Reflex to Confirmation    Methylphenidate And Metabolite,Conf,Urine     Time in: 12:50 pm  Time done: 1:18 pm    Assessment & Plan:  Carito, thank you for involving me in your care today. 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 Cleared for sports.     I ordered urine collection for a mandatory drug screen.    I refilled your short  "acting Adderall 20 mg. It is good for 6 months.    Please remember, you cannot call the pharmacy for a refill of the stimulant, as each prescription is separate onto itself. Please record when you should call for the next prescription to be filled. I have ordered one to be refilled today, the next month to be filled 25, and the third prescription can be filled 25. These days may change based on restrictions placed by your insurance company. Please call a week before you need the next one after that.     For safety, we talked about making a home fire safety plan and having a solid plan for where the family would congregate outside the house in the case of a fire inside the house.  Please also make sure that bedroom doors are closed at night as this will help save lives as well.  Also, please make sure you have a working fire extinguisher. The fire extinguisher in your kitchen should have a \"K\" on it. You should also have a fire blanket. It is important to note that smoke detectors and carbon monoxide detectors  after 10 years.     To prevent sunburn, try to stay in the shade and not have your child out in direct sun between the hours of 10 am and 2 pm. You should use a sunscreen with an SPF equal to or greater than 15 with UVA and UVB protection. Even if it claims to be waterproof, you will need to reapply often; as much as every hour while on a beach. If there is sunburn, Aloe Vera gel helps relieve the pain and heal the skin. Also, hats and sunglasses are helpful if the child will wear them. I recommend Aveeno Baby, Neutrogena Sensitive Skin, and Vanicream sunscreens. You may need to ask the pharmacist for the Vanicream, but it is not a prescription lotion.     Scribe Attestation  By signing my name below, I, Beatriz Garcia, attest that this documentation has been prepared under the direction and in the presence of Dr. Mihaela Hankins.    Provider Attestation - Scribe documentation  All " medical record entries made by the Scribe were at my direction and personally dictated by me. I have reviewed the chart and agree that the record accurately reflects my personal performance of the history, physical exam, discussion and plan.

## 2025-01-29 DIAGNOSIS — F90.2 ATTENTION DEFICIT HYPERACTIVITY DISORDER (ADHD), COMBINED TYPE: Primary | ICD-10-CM

## 2025-02-12 ENCOUNTER — TELEPHONE (OUTPATIENT)
Dept: PEDIATRICS | Facility: CLINIC | Age: 20
End: 2025-02-12
Payer: COMMERCIAL

## 2025-02-12 DIAGNOSIS — F90.2 ATTENTION DEFICIT HYPERACTIVITY DISORDER (ADHD), COMBINED TYPE: Primary | ICD-10-CM

## 2025-02-12 RX ORDER — DEXTROAMPHETAMINE SACCHARATE, AMPHETAMINE ASPARTATE, DEXTROAMPHETAMINE SULFATE AND AMPHETAMINE SULFATE 5; 5; 5; 5 MG/1; MG/1; MG/1; MG/1
TABLET ORAL
Qty: 45 TABLET | Refills: 0 | Status: SHIPPED | OUTPATIENT
Start: 2025-02-12

## 2025-02-12 RX ORDER — DEXTROAMPHETAMINE SACCHARATE, AMPHETAMINE ASPARTATE, DEXTROAMPHETAMINE SULFATE AND AMPHETAMINE SULFATE 5; 5; 5; 5 MG/1; MG/1; MG/1; MG/1
TABLET ORAL
Qty: 45 TABLET | Refills: 0 | Status: SHIPPED | OUTPATIENT
Start: 2025-03-12

## 2025-02-12 RX ORDER — DEXTROAMPHETAMINE SACCHARATE, AMPHETAMINE ASPARTATE, DEXTROAMPHETAMINE SULFATE AND AMPHETAMINE SULFATE 5; 5; 5; 5 MG/1; MG/1; MG/1; MG/1
TABLET ORAL
Qty: 45 TABLET | Refills: 0 | Status: SHIPPED | OUTPATIENT
Start: 2025-04-09

## 2025-02-12 NOTE — TELEPHONE ENCOUNTER
She no showed but then came in a week later.  New prescription was called in by Dr. Hankins after further discussion.

## 2025-02-12 NOTE — TELEPHONE ENCOUNTER
I spoke with Carito regarding her Adderall 20 mg prescription.  She was taking it twice a day, but not consistently, so her prescription was changed to read 1 po daily.  She has found that she needs it twice a day about twice a week.  She is in school from 9 - 5 and twice a week she goes straight to work after school.  She is out of meds and her next prescription has a fill after of 2/17.  The pharmacy will not fill it early without a new prescription from us.  Can we send a new rx to reflect a different quantity?  Maybe 45 instead of 30 with directions to take one - two tablets daily?  I told her we would try to send it today, she is out.    She is going for the drug testing this Friday.  The lab has turned her away twice stating that the order is not correct.  Can you double check, it looks right to me.  Thanks!

## 2025-04-02 ENCOUNTER — TELEPHONE (OUTPATIENT)
Dept: PEDIATRICS | Facility: CLINIC | Age: 20
End: 2025-04-02
Payer: COMMERCIAL

## 2025-04-02 DIAGNOSIS — F90.2 ATTENTION DEFICIT HYPERACTIVITY DISORDER (ADHD), COMBINED TYPE: ICD-10-CM

## 2025-04-02 RX ORDER — DEXTROAMPHETAMINE SACCHARATE, AMPHETAMINE ASPARTATE, DEXTROAMPHETAMINE SULFATE AND AMPHETAMINE SULFATE 5; 5; 5; 5 MG/1; MG/1; MG/1; MG/1
20 TABLET ORAL 2 TIMES DAILY
Qty: 60 TABLET | Refills: 0 | Status: SHIPPED | OUTPATIENT
Start: 2025-04-02 | End: 2025-05-02

## 2025-04-02 NOTE — TELEPHONE ENCOUNTER
Patient called for a refill of amphetamine-dextroamphetamine to be sent to [facility]. She is confused by dosing as her Feb. RX was for 45mg, March was 30mg and on Apr. 9 she can  her next RX and it is for 45mg again. She was taking 2 when they were the 30mg so she is now out and asking for a new prescription to be sent.

## 2025-05-13 DIAGNOSIS — F90.2 ATTENTION DEFICIT HYPERACTIVITY DISORDER (ADHD), COMBINED TYPE: ICD-10-CM

## 2025-05-13 RX ORDER — DEXTROAMPHETAMINE SACCHARATE, AMPHETAMINE ASPARTATE, DEXTROAMPHETAMINE SULFATE AND AMPHETAMINE SULFATE 5; 5; 5; 5 MG/1; MG/1; MG/1; MG/1
20 TABLET ORAL 2 TIMES DAILY
Qty: 60 TABLET | Refills: 0 | Status: SHIPPED | OUTPATIENT
Start: 2025-05-13 | End: 2025-06-12

## 2025-06-12 DIAGNOSIS — F90.2 ATTENTION DEFICIT HYPERACTIVITY DISORDER (ADHD), COMBINED TYPE: ICD-10-CM

## 2025-06-12 RX ORDER — DEXTROAMPHETAMINE SACCHARATE, AMPHETAMINE ASPARTATE, DEXTROAMPHETAMINE SULFATE AND AMPHETAMINE SULFATE 5; 5; 5; 5 MG/1; MG/1; MG/1; MG/1
20 TABLET ORAL 2 TIMES DAILY
Qty: 60 TABLET | Refills: 0 | Status: SHIPPED | OUTPATIENT
Start: 2025-06-12 | End: 2025-07-12

## 2025-06-20 ENCOUNTER — APPOINTMENT (OUTPATIENT)
Dept: PEDIATRICS | Facility: CLINIC | Age: 20
End: 2025-06-20
Payer: COMMERCIAL

## 2025-06-20 VITALS
DIASTOLIC BLOOD PRESSURE: 70 MMHG | BODY MASS INDEX: 19.38 KG/M2 | WEIGHT: 120.6 LBS | SYSTOLIC BLOOD PRESSURE: 112 MMHG | HEART RATE: 72 BPM | HEIGHT: 66 IN

## 2025-06-20 DIAGNOSIS — F90.2 ATTENTION DEFICIT HYPERACTIVITY DISORDER (ADHD), COMBINED TYPE: Primary | ICD-10-CM

## 2025-06-20 PROCEDURE — 96127 BRIEF EMOTIONAL/BEHAV ASSMT: CPT | Performed by: PEDIATRICS

## 2025-06-20 PROCEDURE — 3008F BODY MASS INDEX DOCD: CPT | Performed by: PEDIATRICS

## 2025-06-20 PROCEDURE — 99213 OFFICE O/P EST LOW 20 MIN: CPT | Performed by: PEDIATRICS

## 2025-06-20 PROCEDURE — 1036F TOBACCO NON-USER: CPT | Performed by: PEDIATRICS

## 2025-06-20 RX ORDER — DEXTROAMPHETAMINE SACCHARATE, AMPHETAMINE ASPARTATE, DEXTROAMPHETAMINE SULFATE AND AMPHETAMINE SULFATE 5; 5; 5; 5 MG/1; MG/1; MG/1; MG/1
20 TABLET ORAL 2 TIMES DAILY
Qty: 60 TABLET | Refills: 0 | Status: SHIPPED | OUTPATIENT
Start: 2025-07-10 | End: 2025-08-09

## 2025-06-20 RX ORDER — DEXTROAMPHETAMINE SACCHARATE, AMPHETAMINE ASPARTATE, DEXTROAMPHETAMINE SULFATE AND AMPHETAMINE SULFATE 5; 5; 5; 5 MG/1; MG/1; MG/1; MG/1
TABLET ORAL
Qty: 60 TABLET | Refills: 0 | Status: SHIPPED | OUTPATIENT
Start: 2025-09-04

## 2025-06-20 RX ORDER — DEXTROAMPHETAMINE SACCHARATE, AMPHETAMINE ASPARTATE, DEXTROAMPHETAMINE SULFATE AND AMPHETAMINE SULFATE 5; 5; 5; 5 MG/1; MG/1; MG/1; MG/1
TABLET ORAL
Qty: 60 TABLET | Refills: 0 | Status: SHIPPED | OUTPATIENT
Start: 2025-08-07

## 2025-06-20 ASSESSMENT — ANXIETY QUESTIONNAIRES
3. WORRYING TOO MUCH ABOUT DIFFERENT THINGS: SEVERAL DAYS
6. BECOMING EASILY ANNOYED OR IRRITABLE: SEVERAL DAYS
IF YOU CHECKED OFF ANY PROBLEMS ON THIS QUESTIONNAIRE, HOW DIFFICULT HAVE THESE PROBLEMS MADE IT FOR YOU TO DO YOUR WORK, TAKE CARE OF THINGS AT HOME, OR GET ALONG WITH OTHER PEOPLE: NOT DIFFICULT AT ALL
6. BECOMING EASILY ANNOYED OR IRRITABLE: SEVERAL DAYS
1. FEELING NERVOUS, ANXIOUS, OR ON EDGE: NOT AT ALL
2. NOT BEING ABLE TO STOP OR CONTROL WORRYING: SEVERAL DAYS
5. BEING SO RESTLESS THAT IT IS HARD TO SIT STILL: SEVERAL DAYS
7. FEELING AFRAID AS IF SOMETHING AWFUL MIGHT HAPPEN: NOT AT ALL
4. TROUBLE RELAXING: NOT AT ALL
7. FEELING AFRAID AS IF SOMETHING AWFUL MIGHT HAPPEN: NOT AT ALL
IF YOU CHECKED OFF ANY PROBLEMS ON THIS QUESTIONNAIRE, HOW DIFFICULT HAVE THESE PROBLEMS MADE IT FOR YOU TO DO YOUR WORK, TAKE CARE OF THINGS AT HOME, OR GET ALONG WITH OTHER PEOPLE: NOT DIFFICULT AT ALL
4. TROUBLE RELAXING: NOT AT ALL
1. FEELING NERVOUS, ANXIOUS, OR ON EDGE: NOT AT ALL
5. BEING SO RESTLESS THAT IT IS HARD TO SIT STILL: SEVERAL DAYS
3. WORRYING TOO MUCH ABOUT DIFFERENT THINGS: SEVERAL DAYS
2. NOT BEING ABLE TO STOP OR CONTROL WORRYING: SEVERAL DAYS
GAD7 TOTAL SCORE: 4

## 2025-06-20 ASSESSMENT — PATIENT HEALTH QUESTIONNAIRE - PHQ9
5. POOR APPETITE OR OVEREATING: NOT AT ALL
9. THOUGHTS THAT YOU WOULD BE BETTER OFF DEAD, OR OF HURTING YOURSELF: NOT AT ALL
10. IF YOU CHECKED OFF ANY PROBLEMS, HOW DIFFICULT HAVE THESE PROBLEMS MADE IT FOR YOU TO DO YOUR WORK, TAKE CARE OF THINGS AT HOME, OR GET ALONG WITH OTHER PEOPLE: NOT DIFFICULT AT ALL
3. TROUBLE FALLING OR STAYING ASLEEP OR SLEEPING TOO MUCH: NOT AT ALL
1. LITTLE INTEREST OR PLEASURE IN DOING THINGS: NOT AT ALL
SUM OF ALL RESPONSES TO PHQ QUESTIONS 1-9: 1
7. TROUBLE CONCENTRATING ON THINGS, SUCH AS READING THE NEWSPAPER OR WATCHING TELEVISION: NOT AT ALL
8. MOVING OR SPEAKING SO SLOWLY THAT OTHER PEOPLE COULD HAVE NOTICED. OR THE OPPOSITE - BEING SO FIDGETY OR RESTLESS THAT YOU HAVE BEEN MOVING AROUND A LOT MORE THAN USUAL: NOT AT ALL
8. MOVING OR SPEAKING SO SLOWLY THAT OTHER PEOPLE COULD HAVE NOTICED. OR THE OPPOSITE, BEING SO FIGETY OR RESTLESS THAT YOU HAVE BEEN MOVING AROUND A LOT MORE THAN USUAL: NOT AT ALL
6. FEELING BAD ABOUT YOURSELF - OR THAT YOU ARE A FAILURE OR HAVE LET YOURSELF OR YOUR FAMILY DOWN: NOT AT ALL
SUM OF ALL RESPONSES TO PHQ9 QUESTIONS 1 & 2: 0
7. TROUBLE CONCENTRATING ON THINGS, SUCH AS READING THE NEWSPAPER OR WATCHING TELEVISION: NOT AT ALL
4. FEELING TIRED OR HAVING LITTLE ENERGY: SEVERAL DAYS
3. TROUBLE FALLING OR STAYING ASLEEP: NOT AT ALL
5. POOR APPETITE OR OVEREATING: NOT AT ALL
4. FEELING TIRED OR HAVING LITTLE ENERGY: SEVERAL DAYS
10. IF YOU CHECKED OFF ANY PROBLEMS, HOW DIFFICULT HAVE THESE PROBLEMS MADE IT FOR YOU TO DO YOUR WORK, TAKE CARE OF THINGS AT HOME, OR GET ALONG WITH OTHER PEOPLE: NOT DIFFICULT AT ALL
2. FEELING DOWN, DEPRESSED OR HOPELESS: NOT AT ALL
9. THOUGHTS THAT YOU WOULD BE BETTER OFF DEAD, OR OF HURTING YOURSELF: NOT AT ALL
2. FEELING DOWN, DEPRESSED OR HOPELESS: NOT AT ALL
1. LITTLE INTEREST OR PLEASURE IN DOING THINGS: NOT AT ALL
6. FEELING BAD ABOUT YOURSELF - OR THAT YOU ARE A FAILURE OR HAVE LET YOURSELF OR YOUR FAMILY DOWN: NOT AT ALL

## 2025-06-20 NOTE — PATIENT INSTRUCTIONS
Carito is here today for an ADHD follow-up. Medical, medication and allergy histories were reviewed. She is on short acting Adderall 20 mg, 1 tablet twice per day. She has been doing well.    Your medication is good for 6 months.    I ordered urine for a mandatory drug screen. This is routine when prescribing stimulants.    Please remember, you cannot call the pharmacy for a refill of the stimulant, as each prescription is separate onto itself. Please record when you should call for the next prescription to be filled. I have ordered one to be refilled 7/10/25, the next month to be filled 8/7/25, and the third prescription can be filled 9/4/25. These days may change based on restrictions placed by your insurance company. Please call a week before you need the next one after that.

## 2025-06-20 NOTE — PROGRESS NOTES
HPI:  Carito is here today for an ADHD follow-up. She presents alone. Medical, medication and allergy histories were reviewed. She is on short acting Adderall 20 mg, 1 tablet twice per day. She has been doing well.    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  She works as a manager for a  company.  School and work take up 80 hours per week of her time.    ASQ was a No for questions 1 through 4 and a No for question 5.     The PHQ-9A is 1. This result was 3 on 12/19/24.  The severity measure for depression age 11-17, PHQ-9 modified for adolescence scoring results are as follows: 0-4 = none, 5-9 = mild, 10-14 = moderate, 15-19 = moderately severe, and 20-27 = severe.     The ANNABELLE-7 is 4. This result was 2 on 12/19/24.  The scoring scale is as follows: 0-5 is minimal anxiety, 6-10 is mild anxiety, 11-15 is moderate anxiety, and 16-21 is severe anxiety. A score greater than 7 is clinically significant.    I have personally reviewed the OARRS report for this patient on 6/20/25. I have considered the risks of abuse, dependence, addition, and diversion.      I have reviewed the controlled substance agreement with patient/caregiver on 6/20/25.    Current Medications[1]     Allergies[2]     Family History[3]     Review of Systems:  The following history was obtained from patient.   Constitutional: Otherwise denies fever, chills, or changes in behavior. No difficulties with sleeping, eating, drinking, urine output, or bowel movements.    Eyes, ENT: Denies eye complaints, ear complaints, nasal congestion, runny nose, or sore throat.   Cardio/Resp: Denies chest pain, palpitations, shortness of breath, wheezing, stridor at rest, cough, working hard to breathe, or breathing fast.   GI/Renal: Denies nausea, vomiting, stomachache, diarrhea, or constipation. Denies dysuria or abnormal urine color or smell.   Musculoskeletal/Skin: Denies muscle or joint complaints. Denies skin rash.   Neuro/Psych: Positive ADHD.  Denies headache, dizziness, confusion, irritability, or fussiness.   Endo/heme/lymph: Denies excessive thirst, excessive sweating, bruising, bleeding, or swollen glands.     Physical Exam:  Constitutional: Well developed, well nourished, well hydrated and no acute distress.  Head and Face: Normocephalic, atraumatic.  Inspection and palpation of the face: Normal.  Eyes: Conjunctiva and lids normal.  Ears: External ears without deformities. TM's normal color, normal landmarks, no fluid, non-retracted. External auditory canals without swelling, redness or tenderness.   Nose: Very dusky swollen turbinates.  Mouth, and Throat: Injected uvula and tonsillar pillars.  Neck: Bilateral anterior cervical lymph nodes are 0.5 cm in diameter, non-tender and mobile.    Pulmonary: No grunting, flaring or retractions. Clear to auscultation.  Cardiovascular: Regular rate and rhythm. No significant murmur.  Chest: Normal without deformity.  Abdomen: Soft, non-tender, no masses. No hepatomegaly or splenomegaly.     Diagnoses and all orders for this visit:  Attention deficit hyperactivity disorder (ADHD), combined type  -     amphetamine-dextroamphetamine (Adderall) 20 mg tablet; twice daily by mouth Do not fill before September 4, 2025.  -     amphetamine-dextroamphetamine (Adderall) 20 mg tablet; 2 times per day by mouth Do not fill before August 7, 2025.  -     amphetamine-dextroamphetamine (Adderall) 20 mg tablet; Take 1 tablet (20 mg) by mouth 2 times a day. Take 1 tablet (20 mg) by mouth 2 times a day. Do not fill before July 10, 2025.  -     Opiate/Opioid/Benzo Prescription Compliance; Future    Time in: 3:49 pm  Time done: 4:04 pm    Assessment & Plan:   Carito is here today for an ADHD follow-up. Medical, medication and allergy histories were reviewed. She is on short acting Adderall 20 mg, 1 tablet twice per day. She has been doing well.    Your medication is good for 6 months.    I ordered urine for a mandatory drug screen.  This is routine when prescribing stimulants.    Please remember, you cannot call the pharmacy for a refill of the stimulant, as each prescription is separate onto itself. Please record when you should call for the next prescription to be filled. I have ordered one to be refilled 7/10/25, the next month to be filled 8/7/25, and the third prescription can be filled 9/4/25. These days may change based on restrictions placed by your insurance company. Please call a week before you need the next one after that.     Scribe Attestation  By signing my name below, I, Beatriz Garcia, attest that this documentation has been prepared under the direction and in the presence of Dr. Mihaela Hankins.    Provider Attestation - Scribe documentation  All medical record entries made by the Scribe were at my direction and personally dictated by me. I have reviewed the chart and agree that the record accurately reflects my personal performance of the history, physical exam, discussion and plan.          [1]   Current Outpatient Medications   Medication Sig Dispense Refill    albuterol 90 mcg/actuation inhaler Inhale 2 puffs every 4 hours if needed for wheezing. 18 g 2    amphetamine-dextroamphetamine (Adderall) 20 mg tablet Take 1 tablet (20 mg) by mouth once daily. Do not fill before February 17, 2025. 30 tablet 0    amphetamine-dextroamphetamine (Adderall) 20 mg tablet Once to twice daily depending on work schedule. 45 tablet 0    amphetamine-dextroamphetamine (Adderall) 20 mg tablet 1-2 times per day by mouth depending on work schedule. Do not fill before March 12, 2025. 45 tablet 0    amphetamine-dextroamphetamine (Adderall) 20 mg tablet Take 1 tablet (20 mg) by mouth 2 times a day. 1-2 times per day by mouth depending on work schedule. 60 tablet 0    Auvi-Q 0.3 mg/0.3 mL injection syringe Inject 0.3 mL (0.3 mg) as directed if needed for anaphylaxis.      inhalational spacing device (POCKET CHAMBER) inhaler Use as instructed  1 each 0    medroxyPROGESTERone 150 mg/mL injection INJECT 1 (ONE) mL INTRAMUSCULARLY once       No current facility-administered medications for this visit.   [2]   Allergies  Allergen Reactions    Peanut Anaphylaxis and Hives    Tree Nut Anaphylaxis    Banana Itching    Banana Extract Itching    Egg Hives   [3] No family history on file.